# Patient Record
Sex: FEMALE | Race: WHITE
[De-identification: names, ages, dates, MRNs, and addresses within clinical notes are randomized per-mention and may not be internally consistent; named-entity substitution may affect disease eponyms.]

---

## 2019-04-08 ENCOUNTER — HOSPITAL ENCOUNTER (INPATIENT)
Dept: HOSPITAL 54 - GPS | Age: 84
LOS: 3 days | Discharge: HOME | DRG: 885 | End: 2019-04-11
Attending: FAMILY MEDICINE | Admitting: PSYCHIATRY & NEUROLOGY
Payer: MEDICARE

## 2019-04-08 VITALS — HEIGHT: 60 IN | BODY MASS INDEX: 20.03 KG/M2 | WEIGHT: 102 LBS

## 2019-04-08 DIAGNOSIS — S81.812A: ICD-10-CM

## 2019-04-08 DIAGNOSIS — Z73.6: ICD-10-CM

## 2019-04-08 DIAGNOSIS — M48.00: ICD-10-CM

## 2019-04-08 DIAGNOSIS — I10: ICD-10-CM

## 2019-04-08 DIAGNOSIS — F29: Primary | ICD-10-CM

## 2019-04-08 DIAGNOSIS — D69.2: ICD-10-CM

## 2019-04-08 DIAGNOSIS — G47.00: ICD-10-CM
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
## 2019-04-08 DIAGNOSIS — F41.9: ICD-10-CM

## 2019-04-08 DIAGNOSIS — E78.5: ICD-10-CM
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
## 2019-04-08 DIAGNOSIS — R26.2: ICD-10-CM

## 2019-04-09 VITALS — SYSTOLIC BLOOD PRESSURE: 91 MMHG | DIASTOLIC BLOOD PRESSURE: 51 MMHG

## 2019-04-09 VITALS — DIASTOLIC BLOOD PRESSURE: 93 MMHG | SYSTOLIC BLOOD PRESSURE: 167 MMHG

## 2019-04-09 VITALS — DIASTOLIC BLOOD PRESSURE: 78 MMHG | SYSTOLIC BLOOD PRESSURE: 157 MMHG

## 2019-04-09 VITALS — SYSTOLIC BLOOD PRESSURE: 152 MMHG | DIASTOLIC BLOOD PRESSURE: 68 MMHG

## 2019-04-09 VITALS — SYSTOLIC BLOOD PRESSURE: 135 MMHG | DIASTOLIC BLOOD PRESSURE: 80 MMHG

## 2019-04-09 RX ADMIN — LORAZEPAM PRN MG: 0.5 TABLET ORAL at 19:55

## 2019-04-09 NOTE — NUR
WOUND CARE CONSULT: PT PRESENTS WITH STERI STRIPS TO LEFT LOWER LEG WITH SOME OOZING AS WELL 
AS SCABS AND SKIN TEARS TO RT LOWER LEG, ALL PRESENT ON ADMISSION. PT REFUSED TO HAVE STERI 
STRIPS REMOVED. RECOMMEND DPM CONSULT. PT IS INDEPENDENT WITH BED MOBILITY AND CONTINENT. 
WILL SEE PRN.

## 2019-04-09 NOTE — NUR
SW contacted pts  Basilio [phone] for collateral information and discharge 
planing. SW explained treatment plan and  agreed with pts current treatment and 
stated she will return home once stable for discharge.

## 2019-04-09 NOTE — NUR
ADMITTED THIS 85 YEARS OLD  FEMALE FROM [facility]. PATIENT  WAS PLACED ON [address] DUE TO 
GRAVELY DISABLE UNABLE TO CARE FOR HER SELF WANDERING AROUND. PATIENT IS CONFUSED, REFUSED 
TO SIGN THE CONSENT PAPER, ADVISEMENT EXPLAIN AND SERVE TO THE PATIENT WITH HOSPITAL POLICY 
PATIENT DENIES ANY PAIN OR DISCOMFORT AT THIS TIME BODY ASSESSMENT IS DONE MULTIPAL BRUISES 
ON BOTH HAND AND BOTH LEG , LEFT LEG SKIN TEAR PIC IS TAKEN AND PLACED IN THE CHART, WOUND 
CARE ORDERED PT EVAL  ORDERED PATIENT ASLEEP RIGHT NOW WILL CONTINUES TO MONITOR THE PATIENT 
FOR SAFETY AND FALL AND MAKE ROUND EVERY 15 MINS.

## 2019-04-09 NOTE — NUR
TREATMENT PLAN: SW discussed treatment plan with pt; pt refused to sign and stated, "I do 
not want to sign." SW stated she would come at a later time to have her sign her treatment 
plan and pt responded with, "I don't know if I will sign it when you come back either." SW 
will continue attempting to have pt sign her treatment plan or have pts  sign if pt 
refuses after the third attempt.

## 2019-04-09 NOTE — NUR
GPS RN NOTES:

PT. REFUSED TO TAKES SHOWER ENCOURAGED , STILL REFUSED , PT. STATED I DON'T WANT TAKING AT 
THIS TIME  .

## 2019-04-09 NOTE — NUR
GPS RN NOTES:

PT/ C/O ANXIOUS RESTLESS PACING IN THE ROOM, ATIVAN 0.25 MG PO PRN GIVEN PER PT. REQUEST, 
WILL CONTINUE TO MONITOR.

## 2019-04-10 VITALS — DIASTOLIC BLOOD PRESSURE: 55 MMHG | SYSTOLIC BLOOD PRESSURE: 125 MMHG

## 2019-04-10 VITALS — DIASTOLIC BLOOD PRESSURE: 75 MMHG | SYSTOLIC BLOOD PRESSURE: 157 MMHG

## 2019-04-10 VITALS — DIASTOLIC BLOOD PRESSURE: 69 MMHG | SYSTOLIC BLOOD PRESSURE: 140 MMHG

## 2019-04-10 RX ADMIN — ACETAMINOPHEN PRN MG: 325 TABLET ORAL at 05:23

## 2019-04-10 RX ADMIN — ACETAMINOPHEN PRN MG: 325 TABLET ORAL at 19:38

## 2019-04-10 NOTE — NUR
INITIAL DISCHARGE PLAN: Patient and pts  Basilio [phone] wish for pt to return 
home [address] [place] [address]. SW will help form a safe and proper 
discharge in collaboration with MD.

## 2019-04-10 NOTE — NUR
TREATMENT PLAN: SW discussed treatment plan with pt and after pt reviewed Tx plan; pt 
refused to sign due to not agreeing with psych diagnosis of Psychotic disorder, not 
otherwise specified. Pt stated, "I'm not psychotic I have depression, I'm not signing 
anything!" Pt threw the treatment plan at  and became agitated.

## 2019-04-10 NOTE — NUR
GPS RN NOTES:

 PLACED CALL DR. TOLENTINO ,AND NOTIFIED DR. TOLENTINO REGARDING ABOUT  THE NEW ADMISSION .

## 2019-04-10 NOTE — NUR
SUPPORTIVE COUNSELING: SW discussed pts lack of insight into her mental illness. Pt does not 
acknowledge she has symptoms of bhumika and stated she is only depressed. SW explained the 
reason for her psych hospitalization and pt stated, "that is a lie, that did not happen, I'm 
just depressed."  SW will continue encouraging medication compliance and milieu treatment. 
Pts mood was agitated with congruent affect. Pt was sitting on her bed with her arms crossed 
over her chest.

## 2019-04-10 NOTE — NUR
GPS RN NOTES:

PT. RESTING IN HER BED  , DENIES ANY DISCOMFORT AT THIS TIME , ENDORSE TO ONCOMING NURSE  
FOR CONTINUITY OF CARE.

## 2019-04-11 VITALS — DIASTOLIC BLOOD PRESSURE: 99 MMHG | SYSTOLIC BLOOD PRESSURE: 160 MMHG

## 2019-04-11 VITALS — SYSTOLIC BLOOD PRESSURE: 160 MMHG | DIASTOLIC BLOOD PRESSURE: 76 MMHG

## 2019-04-11 VITALS — DIASTOLIC BLOOD PRESSURE: 81 MMHG | SYSTOLIC BLOOD PRESSURE: 158 MMHG

## 2019-04-11 RX ADMIN — ACETAMINOPHEN PRN MG: 325 TABLET ORAL at 18:41

## 2019-04-11 RX ADMIN — ACETAMINOPHEN PRN MG: 325 TABLET ORAL at 03:22

## 2019-04-11 RX ADMIN — LORAZEPAM PRN MG: 0.5 TABLET ORAL at 03:19

## 2019-04-11 NOTE — NUR
TREATMENT PLANNING: SW discussed pts treatment plan with pts  Basilio, SW also went 
over medication list with pts  and read Psychiatrist note.  stated that he 
agrees with pts treatment plan and stated that psychiatrist evaluation was "on point."

## 2019-04-11 NOTE — NUR
Nursing Discharge Note: 85 year old female discharged to home in stable condition, med 
compliant, cooperative with treatment plans. Patient denies si/hi and instructed to the 
closest ER if developing SI?HI. behavior improved. Educated patient about after care plan 
and copy provided. Returned personal belongings to patient. Patient was wheeled down by cna 
assigned at the time. Patient was accompanied by her . Patient refused to take photos 
of her skin.

## 2019-04-12 NOTE — NUR
DISCHARGE NOTE:  Pt was discharged at 20:00 via private vehicle home to [address] [place] [address] [place] [address]. Pts  Basilio [phone] picked pt up from the hospital and 
transferred home. Per nursing notes pts denied suicidal/homicidal ideation and denied 
visual/auditory hallucinations. Pts mood was anxious with congruent affect. Pt was given a 
referral to follow up with mental health services [facility] [address] [place] [address]. 
[facility] [address] where pt can walk in [facility] 8:30 a.m. - 8:00 p.m and [facility] 8:30 a.m. - 
5:00 p.m. Pt has a scheduled follow up appointment with Dr. Nighat Anand [address] [place] [phone] on Monday April 22, 2019 
at 11:00am. The multidisciplinary exitcare form was done, printed, signed, and given to the 
patient.